# Patient Record
Sex: MALE | Race: BLACK OR AFRICAN AMERICAN | Employment: FULL TIME | URBAN - METROPOLITAN AREA
[De-identification: names, ages, dates, MRNs, and addresses within clinical notes are randomized per-mention and may not be internally consistent; named-entity substitution may affect disease eponyms.]

---

## 2017-10-06 ENCOUNTER — OFFICE VISIT (OUTPATIENT)
Dept: FAMILY MEDICINE | Facility: CLINIC | Age: 32
End: 2017-10-06
Payer: COMMERCIAL

## 2017-10-06 VITALS
OXYGEN SATURATION: 97 % | BODY MASS INDEX: 20.29 KG/M2 | HEART RATE: 98 BPM | SYSTOLIC BLOOD PRESSURE: 122 MMHG | TEMPERATURE: 97.2 F | HEIGHT: 69 IN | DIASTOLIC BLOOD PRESSURE: 69 MMHG | WEIGHT: 137 LBS

## 2017-10-06 DIAGNOSIS — Z00.00 ENCOUNTER FOR ROUTINE ADULT HEALTH EXAMINATION WITHOUT ABNORMAL FINDINGS: Primary | ICD-10-CM

## 2017-10-06 DIAGNOSIS — L20.84 INTRINSIC ECZEMA: ICD-10-CM

## 2017-10-06 DIAGNOSIS — Z13.6 CARDIOVASCULAR SCREENING; LDL GOAL LESS THAN 160: ICD-10-CM

## 2017-10-06 DIAGNOSIS — Z23 NEED FOR PROPHYLACTIC VACCINATION AND INOCULATION AGAINST INFLUENZA: ICD-10-CM

## 2017-10-06 LAB
CHOLEST SERPL-MCNC: 92 MG/DL
HDLC SERPL-MCNC: 62 MG/DL
LDLC SERPL CALC-MCNC: 21 MG/DL
NONHDLC SERPL-MCNC: 30 MG/DL
TRIGL SERPL-MCNC: 43 MG/DL

## 2017-10-06 PROCEDURE — 36415 COLL VENOUS BLD VENIPUNCTURE: CPT | Performed by: FAMILY MEDICINE

## 2017-10-06 PROCEDURE — 80061 LIPID PANEL: CPT | Performed by: FAMILY MEDICINE

## 2017-10-06 PROCEDURE — 99395 PREV VISIT EST AGE 18-39: CPT | Mod: 25 | Performed by: FAMILY MEDICINE

## 2017-10-06 PROCEDURE — 90686 IIV4 VACC NO PRSV 0.5 ML IM: CPT | Performed by: FAMILY MEDICINE

## 2017-10-06 PROCEDURE — 90471 IMMUNIZATION ADMIN: CPT | Performed by: FAMILY MEDICINE

## 2017-10-06 RX ORDER — TRIAMCINOLONE ACETONIDE 5 MG/G
CREAM TOPICAL
Qty: 30 G | Refills: 1 | Status: SHIPPED | OUTPATIENT
Start: 2017-10-06 | End: 2020-12-22

## 2017-10-06 ASSESSMENT — PAIN SCALES - GENERAL: PAINLEVEL: NO PAIN (0)

## 2017-10-06 NOTE — PROGRESS NOTES
SUBJECTIVE:   CC: Narinder Whitman is an 31 year old male who presents for preventative health visit.   He is accompanied by his wife and daughter.     Healthy Habits:    Do you get at least three servings of calcium containing foods daily (dairy, green leafy vegetables, etc.)? yes    Amount of exercise or daily activities, outside of work: no just working    Problems taking medications regularly not applicable    Medication side effects: No    Have you had an eye exam in the past two years? no    Do you see a dentist twice per year? yes    Do you have sleep apnea, excessive snoring or daytime drowsiness?no      Today's PHQ-2 Score:   PHQ-2 ( 1999 [employer]) 10/6/2017 10/29/2015   Q1: Little interest or pleasure in doing things 0 0   Q2: Feeling down, depressed or hopeless 0 0   PHQ-2 Score 0 0       Abuse: Current or Past(Physical, Sexual or Emotional)- No  Do you feel safe in your environment - Yes  Social History   Substance Use Topics     Smoking status: Never Smoker     Smokeless tobacco: Never Used     Alcohol use No     The patient does not drink >3 drinks per day nor >7 drinks per week.    Past medical, family, and social histories, medications, and allergies are reviewed and updated in Epic.     ROS:  C: NEGATIVE for fever, chills, change in weight  I: He mentions an itchy area on his leg that has been there for years.   E: NEGATIVE for vision changes or irritation  ENT: NEGATIVE for ear, mouth and throat problems  R: NEGATIVE for significant cough or SOB  CV: NEGATIVE for chest pain, palpitations or peripheral edema  GI: NEGATIVE for nausea, abdominal pain, heartburn, or change in bowel habits   male: negative for dysuria, hematuria, decreased urinary stream, erectile dysfunction, urethral discharge  M: NEGATIVE for significant arthralgias or myalgia  N: NEGATIVE for weakness, dizziness or paresthesias  P: NEGATIVE for changes in mood or affect    Any history above obtained by the Medical Assistant was  "reviewed by Dr. Talon Calvin MD, and edited when necessary.    This document serves as a record of the services and decisions personally performed and made by Dr. Calvin. It was created on his behalf by Luna Joseph, a trained medical scribe. The creation of this document is based the provider's statements to the medical scribe.  Luna Joseph October 6, 2017 2:04 PM      OBJECTIVE:   /69  Pulse 98  Temp 97.2  F (36.2  C) (Oral)  Ht 5' 8.5\" (1.74 m)  Wt 137 lb (62.1 kg)  SpO2 97%  BMI 20.53 kg/m2     EXAM:  GENERAL: healthy, alert and no distress  EYES: Eyes grossly normal to inspection, PERRL and conjunctivae and sclerae normal  HENT: ear canals and TM's normal, nose and mouth without ulcers or lesions  NECK: no adenopathy, no asymmetry, masses, or scars and thyroid normal to palpation  RESP: lungs clear to auscultation, no crackles or wheezes, no areas of dullness, no tachypnea   CV: regular rate and rhythm, normal S1 S2, no S3 or S4, no murmur, click or rub, no peripheral edema and peripheral pulses strong  ABDOMEN: soft, nontender, no hepatosplenomegaly, no masses and bowel sounds normal   (male): normal male genitalia without lesions or urethral discharge, no hernia  MS: no gross musculoskeletal defects noted, no edema  SKIN: He has a large patch on the right lateral leg extending to the knee with diffuse mild lichenification and post-inflammatory hyperpigmentation.  NEURO: Normal strength and tone, mentation intact and speech normal, DTRs symmetrical, cranial nerves 2-12 intact   PSYCH: mentation appears normal, affect normal/bright     ASSESSMENT/PLAN:   (Z00.00) Encounter for routine adult health examination without abnormal findings  (primary encounter diagnosis)  Comment: Negative screening exam; up-to-date on preventive services.   Plan: Lipid panel reflex to direct LDL        Follow up in 2-3 years.    (L20.84) Intrinsic eczema  Comment: Chronic in a fixed location. I don't " "suspect any other underlying disease.   Plan: triamcinolone (KENALOG) 0.5 % cream        Follow up as needed.     (Z13.6) CARDIOVASCULAR SCREENING; LDL GOAL LESS THAN 160  Comment: Non-fasting.   Plan: Lipid panel reflex to direct LDL        Monitor periodically.     (Z23) Need for prophylactic vaccination and inoculation against influenza  Comment: Influenza vaccine requested and given.   Plan: FLU VAC, SPLIT VIRUS IM > 3 YO (QUADRIVALENT)         [19548], Vaccine Administration, Initial         [48865]            COUNSELING:  Reviewed preventive health counseling, as reflected in patient instructions    BP Screening:   Last 3 BP Readings:    BP Readings from Last 3 Encounters:   10/06/17 122/69   10/17/16 119/70   10/16/16 107/65     The following was recommended to the patient:  Re-screen BP within a year and recommended lifestyle modifications     reports that he has never smoked. He has never used smokeless tobacco.    Estimated body mass index is 20.53 kg/(m^2) as calculated from the following:    Height as of this encounter: 5' 8.5\" (1.74 m).    Weight as of this encounter: 137 lb (62.1 kg). For exercise, he walks, runs on the treadmill, and does sit ups.       Counseling Resources:  ATP IV Guidelines  Pooled Cohorts Equation Calculator  FRAX Risk Assessment  ICSI Preventive Guidelines  Dietary Guidelines for Americans, 2010  USDA's MyPlate  ASA Prophylaxis  Lung CA Screening    The information in this document, created by the medical scribe for me, accurately reflects the services I personally performed and the decisions made by me. I have reviewed and approved this document for accuracy prior to leaving the patient care area. October 6, 2017 2:04 PM      Talon Calvin MD  [facility]  "

## 2017-10-06 NOTE — MR AVS SNAPSHOT
After Visit Summary   10/6/2017    Narinder Whitman    MRN: [id number]           Patient Information     Date Of Birth          1985        Visit Information        Provider Department      10/6/2017 1:40 PM Talon Calvin MD [facility]        Today's Diagnoses     Encounter for routine adult health examination without abnormal findings    -  1    Intrinsic eczema        CARDIOVASCULAR SCREENING; LDL GOAL LESS THAN 160        Need for prophylactic vaccination and inoculation against influenza          Care Instructions        ================================================================================  Normal Values   Blood pressure  <140/90 for most adults    <130/80 for some chronic diseases (ask your care team about yours)    BMI (body mass index)  18.5-25 kg/m2 (based on height and weight)     Thank you for visiting [facility]    Normal or non-critical lab and imaging results will be communicated to you by MyChart, letter or phone within 7 days.  If you do not hear from us within 10 days, please call the clinic. If you have a critical or abnormal lab result, we will notify you by phone as soon as possible.     If you have any questions regarding your visit please contact:     Team Comfort:   Clinic Hours Telephone Number   Dr. Talon Nguyễn Dr. Vocal 7am-5pm  Monday - Friday [phone]  Brett Davila RN   Pharmacy 8:00am-8pm Monday-Friday    9am-5pm Saturday-Sunday [phone]   Urgent Care 11am-9pm Monday-Friday        9am-5pm Saturday-Sunday [phone]     After hours, weekend or if you need to make an appointment with your primary provider please call [phone].   After Hours nurse advise: call [facility]: [phone]    Medication Refills:  Call your pharmacy and they will forward the refill to us. Please allow 3 business days for your refills to be  completed.          Preventive Health Recommendations  Male Ages 26 - 39    Yearly exam:             See your health care provider every year in order to  o   Review health changes.   o   Discuss preventive care.    o   Review your medicines if your doctor has prescribed any.    You should be tested each year for STDs (sexually transmitted diseases), if you re at risk.     After age 35, talk to your provider about cholesterol testing. If you are at risk for heart disease, have your cholesterol tested at least every 5 years.     If you are at risk for diabetes, you should have a diabetes test (fasting glucose).  Shots: Get a flu shot each year. Get a tetanus shot every 10 years.     Nutrition:    Eat at least 5 servings of fruits and vegetables daily.     Eat whole-grain bread, whole-wheat pasta and brown rice instead of white grains and rice.     Talk to your provider about Calcium and Vitamin D.     Lifestyle    Exercise for at least 150 minutes a week (30 minutes a day, 5 days a week). This will help you control your weight and prevent disease.     Limit alcohol to one drink per day.     No smoking.     Wear sunscreen to prevent skin cancer.     See your dentist every six months for an exam and cleaning.             Follow-ups after your visit        Follow-up notes from your care team     Return in about 2 years (around 10/6/2019) for full physical.      Your next 10 appointments already scheduled     Nov 06, 2017  2:00 PM CST   New Visit with Joseluis Villegas OD   [facility]    [address] [place] [id number] [phone]              Who to contact     If you have questions or need follow up information about today's clinic visit or your schedule please contact [facility] directly at [phone].  Normal or non-critical lab and imaging results will be communicated to you by MyChart, letter or phone within 4 business  "days after the clinic has received the results. If you do not hear from us within 7 days, please contact the clinic through [employer] or phone. If you have a critical or abnormal lab result, we will notify you by phone as soon as possible.  Submit refill requests through [employer] or call your pharmacy and they will forward the refill request to us. Please allow 3 business days for your refill to be completed.          Additional Information About Your Visit        [employer] Information     [employer] lets you send messages to your doctor, view your test results, renew your prescriptions, schedule appointments and more. To sign up, go to [email or web address] . Click on \"Log in\" on the left side of the screen, which will take you to the Welcome page. Then click on \"Sign up Now\" on the right side of the page.     You will be asked to enter the access code listed below, as well as some personal information. Please follow the directions to create your username and password.     Your access code is: [email or web address]  Expires: 2018  2:20 PM     Your access code will  in 90 days. If you need help or a new code, please call your [facility] clinic or [phone].        Care EveryWhere ID     This is your Care EveryWhere ID. This could be used by other organizations to access your [facility] medical records  [id number]        Your Vitals Were     Pulse Temperature Height Pulse Oximetry BMI (Body Mass Index)       98 97.2  F (36.2  C) (Oral) 5' 8.5\" (1.74 m) 97% 20.53 kg/m2        Blood Pressure from Last 3 Encounters:   10/06/17 122/69   10/17/16 119/70   10/16/16 107/65    Weight from Last 3 Encounters:   10/06/17 137 lb (62.1 kg)   10/17/16 129 lb (58.5 kg)   10/16/16 124 lb 3.2 oz (56.3 kg)              We Performed the Following     FLU VAC, SPLIT VIRUS IM > 3 YO (QUADRIVALENT) [54380]     Lipid panel reflex to direct LDL     Vaccine Administration, Initial [53561]          Today's Medication Changes          These " changes are accurate as of: 10/6/17  2:20 PM.  If you have any questions, ask your nurse or doctor.               Start taking these medicines.        Dose/Directions    triamcinolone 0.5 % cream   Commonly known as:  KENALOG   Used for:  Intrinsic eczema   Started by:  Talon Calvin MD        Apply sparingly to affected area up to 3 times per day as needed.   Quantity:  30 g   Refills:  1         Stop taking these medicines if you haven't already. Please contact your care team if you have questions.     cyclobenzaprine 10 MG tablet   Commonly known as:  FLEXERIL   Stopped by:  Talon Calvin MD           indomethacin 25 MG capsule   Commonly known as:  INDOCIN   Stopped by:  Talon Calvin MD                Where to get your medicines      These [place] [facility] [place] [address]  [address], [facility] [address]     Phone:  [phone]     triamcinolone 0.5 % cream                Primary Care Provider Office Phone # Fax #    Talon Calvin [phone]       [address]  [facility] [address]        Equal Access to Services     [facility] AH: Hadii aad ku hadasho Soomaali, waaxda luqadaha, qaybta kaalmada adeegyada, waxay idiin hayaan thomas meadearasuzan lajose j ah. So [facility] [phone].    ATENCIÓN: Si habla español, tiene a coyle disposición servicios gratuitos de asistencia lingüística. [facility] [phone].    We comply with applicable federal civil rights laws and [place] laws. We do not discriminate on the basis of race, color, national origin, age, disability, sex, sexual orientation, or gender identity.            Thank you!     Thank you for choosing [facility]  for your care. Our goal is always to provide you with excellent care. Hearing back from our patients is one way we can continue to improve our services. Please take a few minutes to complete the written survey that you may  receive in the mail after your visit with us. Thank you!             Your Updated Medication List - Protect others around you: Learn how to safely use, store and throw away your medicines at www.disposemymeds.org.          This list is accurate as of: 10/6/17  2:20 PM.  Always use your most recent med list.                   Brand Name Dispense Instructions for use Diagnosis    triamcinolone 0.5 % cream    KENALOG    30 g    Apply sparingly to affected area up to 3 times per day as needed.    Intrinsic eczema

## 2017-10-06 NOTE — PATIENT INSTRUCTIONS
================================================================================  Normal Values   Blood pressure  <140/90 for most adults    <130/80 for some chronic diseases (ask your care team about yours)    BMI (body mass index)  18.5-25 kg/m2 (based on height and weight)     Thank you for visiting [facility]    Normal or non-critical lab and imaging results will be communicated to you by MyChart, letter or phone within 7 days.  If you do not hear from us within 10 days, please call the clinic. If you have a critical or abnormal lab result, we will notify you by phone as soon as possible.     If you have any questions regarding your visit please contact:     Team Comfort:   Clinic Hours Telephone Number   Dr. Talon Nguyễn Dr. Vocal 7am-5pm  Monday - Friday [phone]  Brett RN  Donna Davila RN   Pharmacy 8:00am-8pm Monday-Friday    9am-5pm Saturday-Sunday [phone]   Urgent Care 11am-9pm Monday-Friday        9am-5pm Saturday-Sunday [phone]     After hours, weekend or if you need to make an appointment with your primary provider please call [phone].   After Hours nurse advise: call [facility]: [phone]    Medication Refills:  Call your pharmacy and they will forward the refill to us. Please allow 3 business days for your refills to be completed.          Preventive Health Recommendations  Male Ages 26 - 39    Yearly exam:             See your health care provider every year in order to  o   Review health changes.   o   Discuss preventive care.    o   Review your medicines if your doctor has prescribed any.    You should be tested each year for STDs (sexually transmitted diseases), if you re at risk.     After age 35, talk to your provider about cholesterol testing. If you are at risk for heart disease, have your cholesterol tested at least every 5 years.     If you are at risk for diabetes, you should have a  diabetes test (fasting glucose).  Shots: Get a flu shot each year. Get a tetanus shot every 10 years.     Nutrition:    Eat at least 5 servings of fruits and vegetables daily.     Eat whole-grain bread, whole-wheat pasta and brown rice instead of white grains and rice.     Talk to your provider about Calcium and Vitamin D.     Lifestyle    Exercise for at least 150 minutes a week (30 minutes a day, 5 days a week). This will help you control your weight and prevent disease.     Limit alcohol to one drink per day.     No smoking.     Wear sunscreen to prevent skin cancer.     See your dentist every six months for an exam and cleaning.

## 2017-10-06 NOTE — PROGRESS NOTES
Injectable Influenza Immunization Documentation    1.  Is the person to be vaccinated sick today?   No    2. Does the person to be vaccinated have an allergy to a component   of the vaccine?   No    3. Has the person to be vaccinated ever had a serious reaction   to influenza vaccine in the past?   No    4. Has the person to be vaccinated ever had Guillain-Barré syndrome?   No    Form completed by Abebe JOHNSON

## 2017-10-06 NOTE — NURSING NOTE
"Chief Complaint   Patient presents with     Physical     Flu Shot       Initial /69  Pulse 98  Temp 97.2  F (36.2  C) (Oral)  Ht 5' 8.5\" (1.74 m)  Wt 137 lb (62.1 kg)  SpO2 97%  BMI 20.53 kg/m2 Estimated body mass index is 20.53 kg/(m^2) as calculated from the following:    Height as of this encounter: 5' 8.5\" (1.74 m).    Weight as of this encounter: 137 lb (62.1 kg).  Medication Reconciliation: complete   Abebe JOHNSON        "

## 2017-10-06 NOTE — LETTER
2017      Narinder Whitman  [address]  [place] [address]              Dear Narinder Whitman      Your LDL (bad cholesterol) was at goal. Your HDL (good cholesterol) was normal. Your triglycerides were normal. You need to recheck fasting labs every five years.     Enclosed is a copy of the results.  It was a pleasure to see you at your last appointment.    If you have any questions or concerns, please call myself or my nurse at [phone].      Sincerely,      Talon Calvin MD/WALESKA Lora MA  TEAM COMFORT      Results for orders placed or performed in visit on 10/06/17   Lipid panel reflex to direct LDL   Result Value Ref Range    Cholesterol 92 <200 mg/dL    Triglycerides 43 <150 mg/dL    HDL Cholesterol 62 >39 mg/dL    LDL Cholesterol Calculated 21 <100 mg/dL    Non HDL Cholesterol 30 <130 mg/dL                 RE: Narinder Whitman   MRN: [id number]  : 1985  ENC DATE: Oct 6, 2017

## 2017-11-06 ENCOUNTER — OFFICE VISIT (OUTPATIENT)
Dept: OPTOMETRY | Facility: CLINIC | Age: 32
End: 2017-11-06
Payer: COMMERCIAL

## 2017-11-06 DIAGNOSIS — H52.13 MYOPIA OF BOTH EYES: ICD-10-CM

## 2017-11-06 DIAGNOSIS — H52.223 REGULAR ASTIGMATISM OF BOTH EYES: ICD-10-CM

## 2017-11-06 PROCEDURE — 92004 COMPRE OPH EXAM NEW PT 1/>: CPT | Performed by: OPTOMETRIST

## 2017-11-06 PROCEDURE — 92015 DETERMINE REFRACTIVE STATE: CPT | Performed by: OPTOMETRIST

## 2017-11-06 ASSESSMENT — EXTERNAL EXAM - RIGHT EYE: OD_EXAM: NORMAL

## 2017-11-06 ASSESSMENT — TONOMETRY
OD_IOP_MMHG: 17
IOP_METHOD: TONOPEN
OS_IOP_MMHG: 14

## 2017-11-06 ASSESSMENT — VISUAL ACUITY
OD_SC+: -1
OS_SC: 20/25
OS_SC+: -1
OD_CC: 20/30
OS_CC+: -2
OD_SC: 20/30
OD_CC+: -2
METHOD: SNELLEN - LINEAR
OS_CC: 20/25
OD_CC: 20/25
CORRECTION_TYPE: GLASSES
OS_CC: 20/30

## 2017-11-06 ASSESSMENT — REFRACTION_MANIFEST
OS_SPHERE: -0.75
OS_AXIS: 078
OD_CYLINDER: +0.50
OD_AXIS: 090
OS_CYLINDER: +0.50
OD_SPHERE: -1.50

## 2017-11-06 ASSESSMENT — CONF VISUAL FIELD
OD_NORMAL: 1
OS_NORMAL: 1

## 2017-11-06 ASSESSMENT — REFRACTION_WEARINGRX
OS_CYLINDER: +0.50
OD_SPHERE: -0.75
OD_CYLINDER: SPHERE
OS_SPHERE: -0.25
SPECS_TYPE: SVL
OS_AXIS: 050

## 2017-11-06 ASSESSMENT — EXTERNAL EXAM - LEFT EYE: OS_EXAM: NORMAL

## 2017-11-06 ASSESSMENT — CUP TO DISC RATIO
OS_RATIO: 0.3
OD_RATIO: 0.2

## 2017-11-06 ASSESSMENT — SLIT LAMP EXAM - LIDS
COMMENTS: NORMAL
COMMENTS: NORMAL

## 2017-11-06 NOTE — PROGRESS NOTES
Chief Complaint   Patient presents with     COMPREHENSIVE EYE EXAM      Accompanied by daughter and goddaughter  Last Eye Exam: 5 years  Dilated Previously: No, side effects of dilation explained today,info given to patient     What are you currently using to see?  glasses       Distance Vision Acuity: Satisfied with vision    Near Vision Acuity: Satisfied with vision while reading  with glasses    Eye Comfort: good  Do you use eye drops? : No  Occupation or Hobbies: graphic design     Carmen Mar Optometric Assistant, A.B.O.C.          Medical, surgical and family histories reviewed and updated 11/6/2017.       OBJECTIVE: See Ophthalmology exam    ASSESSMENT:    ICD-10-CM    1. Myopia of both eyes H52.13 EYE EXAM (SIMPLE-NONBILLABLE)     REFRACTION   2. Regular astigmatism of both eyes H52.223 EYE EXAM (SIMPLE-NONBILLABLE)     REFRACTION      PLAN:     Patient Instructions   Recommend new glasses.    Return in 1 year for a complete eye exam or sooner if needed.    Joseluis Villegas, OD

## 2017-11-06 NOTE — PATIENT INSTRUCTIONS
Recommend new glasses.    Return in 1 year for a complete eye exam or sooner if needed.    Joseluis Villegas OD      The affects of the dilating drops last for 4- 6 hours.  You will be more sensitive to light and vision will be blurry up close.  Mydriatic sunglasses were given if needed.      Optometry Providers       Clinic Locations                                 Telephone Number   Dr. Ama Villegas   [facility]  [phone]     [facility] Hours:                [facility] Hours:       [facility] Hours:  [address]   [address] [facility] [address]     [address] [place] [address]   [place] [address]    [place] [address]  Phone: [phone]                    Phone [phone]                      Phone: [phone]                          Monday 8:00-7:00                          Monday 8:00-7:00                          Monday 8:00-7:00              Tuesday 8:00-6:00                          Tuesday 8:00-7:00                          Tuesday 8:00-7:00              Wednesday 8:00-6:00                  Wednesday 8:00-7:00                   Wednesday 8:00-7:00      Thursday 8:00-6:00                        Thursday 8:00-7:00                         Thursday 8:00-7:00            Friday 8:00-5:00                              Friday 8:00-5:00                              Friday 8:00-5:00    Please log on to [email or web address] to order your contact lenses.  The link is found on the [facility] page.  As always, Thank you for trusting us with your health care needs!

## 2017-11-06 NOTE — MR AVS SNAPSHOT
After Visit Summary   11/6/2017    Narinder Whitman    MRN: [id number]           Patient Information     Date Of Birth          1985        Visit Information        Provider Department      11/6/2017 2:00 PM Joseluis Villegas OD [facility]        Today's Diagnoses     Myopia of both eyes        Regular astigmatism of both eyes          Care Instructions    Recommend new glasses.    Return in 1 year for a complete eye exam or sooner if needed.    Joseluis Villegas, MARTINA      The affects of the dilating drops last for 4- 6 hours.  You will be more sensitive to light and vision will be blurry up close.  Mydriatic sunglasses were given if needed.      Optometry Providers       Clinic Locations                                 Telephone Number   Dr. Ama Villegas   [facility]  [phone]     [facility] Hours:                [facility] Hours:       [facility] Hours:  [address]   [address] [facility]     [address] [place] [address]   [place] [address]    [place] [address]  Phone: [phone]                    Phone [phone]                      Phone: [phone]                          Monday 8:00-7:00                          Monday 8:00-7:00                          Monday 8:00-7:00              Tuesday 8:00-6:00                          Tuesday 8:00-7:00                          Tuesday 8:00-7:00              Wednesday 8:00-6:00                  Wednesday 8:00-7:00                   Wednesday 8:00-7:00      Thursday 8:00-6:00                        Thursday 8:00-7:00                         Thursday 8:00-7:00            Friday 8:00-5:00                              Friday 8:00-5:00                              Friday 8:00-5:00    Please log on to [email or web address] to order your contact lenses.  The link is found on the Eye Care and  "Vision Services page.  As always, Thank you for trusting us with your health care needs!            Follow-ups after your visit        Who to contact     If you have questions or need follow up information about today's clinic visit or your schedule please contact [facility] NARESH Hershey directly at [phone].  Normal or non-critical lab and imaging results will be communicated to you by MyChart, letter or phone within 4 business days after the clinic has received the results. If you do not hear from us within 7 days, please contact the clinic through MyChart or phone. If you have a critical or abnormal lab result, we will notify you by phone as soon as possible.  Submit refill requests through [employer] or call your pharmacy and they will forward the refill request to us. Please allow 3 business days for your refill to be completed.          Additional Information About Your Visit        MyChart Information     [employer] lets you send messages to your doctor, view your test results, renew your prescriptions, schedule appointments and more. To sign up, go to [email or web address] . Click on \"Log in\" on the left side of the screen, which will take you to the Welcome page. Then click on \"Sign up Now\" on the right side of the page.     You will be asked to enter the access code listed below, as well as some personal information. Please follow the directions to create your username and password.     Your access code is: [email or web address]  Expires: 2018  1:20 PM     Your access code will  in 90 days. If you need help or a new code, please call your [facility] clinic or [phone].        Care EveryWhere ID     This is your Care EveryWhere ID. This could be used by other organizations to access your [facility] medical records  [id number]         Blood Pressure from Last 3 Encounters:   10/06/17 122/69   10/17/16 119/70   10/16/16 107/65    Weight from Last 3 Encounters:   10/06/17 62.1 kg (137 lb)   10/17/16 " 58.5 kg (129 lb)   10/16/16 56.3 kg (124 lb 3.2 oz)              We Performed the Following     EYE EXAM (SIMPLE-NONBILLABLE)     REFRACTION        Primary Care Provider Office Phone # Fax #    Talon Calvin [phone] [address]  [facility] [address]        Equal Access to Services     [facility]: Hadii aad ku hadasho Soomaali, waaxda luqadaha, qaybta kaalmada adeegyada, waxay idiin hayaan adeeg kharash la'aan ah. So [facility] [phone].    ATENCIÓN: Si habla español, tiene a coyle disposición servicios gratuitos de asistencia lingüística. Llame al [phone].    We comply with applicable federal civil rights laws and [place] laws. We do not discriminate on the basis of race, color, national origin, age, disability, sex, sexual orientation, or gender identity.            Thank you!     Thank you for choosing [facility]  for your care. Our goal is always to provide you with excellent care. Hearing back from our patients is one way we can continue to improve our services. Please take a few minutes to complete the written survey that you may receive in the mail after your visit with us. Thank you!             Your Updated Medication List - Protect others around you: Learn how to safely use, store and throw away your medicines at www.disposemymeds.org.          This list is accurate as of: 11/6/17  3:07 PM.  Always use your most recent med list.                   Brand Name Dispense Instructions for use Diagnosis    triamcinolone 0.5 % cream    KENALOG    30 g    Apply sparingly to affected area up to 3 times per day as needed.    Intrinsic eczema

## 2019-10-30 ENCOUNTER — OFFICE VISIT (OUTPATIENT)
Dept: FAMILY MEDICINE | Facility: CLINIC | Age: 34
End: 2019-10-30
Payer: COMMERCIAL

## 2019-10-30 VITALS
TEMPERATURE: 97.9 F | HEART RATE: 74 BPM | SYSTOLIC BLOOD PRESSURE: 120 MMHG | OXYGEN SATURATION: 98 % | DIASTOLIC BLOOD PRESSURE: 78 MMHG | RESPIRATION RATE: 16 BRPM

## 2019-10-30 DIAGNOSIS — Z71.84 TRAVEL ADVICE ENCOUNTER: Primary | ICD-10-CM

## 2019-10-30 PROCEDURE — 90632 HEPA VACCINE ADULT IM: CPT | Mod: GA | Performed by: NURSE PRACTITIONER

## 2019-10-30 PROCEDURE — 90686 IIV4 VACC NO PRSV 0.5 ML IM: CPT | Performed by: NURSE PRACTITIONER

## 2019-10-30 PROCEDURE — 90717 YELLOW FEVER VACCINE SUBQ: CPT | Mod: GA | Performed by: NURSE PRACTITIONER

## 2019-10-30 PROCEDURE — 99402 PREV MED CNSL INDIV APPRX 30: CPT | Mod: 25 | Performed by: NURSE PRACTITIONER

## 2019-10-30 PROCEDURE — 90691 TYPHOID VACCINE IM: CPT | Mod: GA | Performed by: NURSE PRACTITIONER

## 2019-10-30 PROCEDURE — 90471 IMMUNIZATION ADMIN: CPT | Performed by: NURSE PRACTITIONER

## 2019-10-30 PROCEDURE — 90472 IMMUNIZATION ADMIN EACH ADD: CPT | Mod: GA | Performed by: NURSE PRACTITIONER

## 2019-10-30 RX ORDER — AZITHROMYCIN 500 MG/1
500 TABLET, FILM COATED ORAL DAILY
Qty: 3 TABLET | Refills: 0 | Status: SHIPPED | OUTPATIENT
Start: 2019-10-30 | End: 2019-11-02

## 2019-10-30 RX ORDER — ATOVAQUONE AND PROGUANIL HYDROCHLORIDE 250; 100 MG/1; MG/1
1 TABLET, FILM COATED ORAL DAILY
Qty: 42 TABLET | Refills: 0 | Status: SHIPPED | OUTPATIENT
Start: 2019-10-30 | End: 2020-12-22

## 2019-10-30 NOTE — PATIENT INSTRUCTIONS
Today October 30, 2019 you received the    Flu Vaccine    Hepatitis A Vaccine - Please return on 4/27/20 or later for your 2nd and final dose.    Yellow Fever (YF)    Typhoid - injectable. This vaccine is valid for two years.   .    These appointments can be made as a NURSE ONLY visit.    **It is very important for the vaccinations to be given on the scheduled day(s), this helps ensure you receive the full effectiveness of the vaccine.**    Please call [facility] with any questions [phone]    Thank you for visiting [facility]

## 2019-10-30 NOTE — PROGRESS NOTES
Nurse Note      Itinerary:  [facility]       Departure Date: 11/14/2019      Return Date: 12/15/2019      Length of Trip 1 month       Reason for Travel: Visiting friends and relatives           Urban or rural: both      Accommodations: Family home        IMMUNIZATION HISTORY  Have you received any immunizations within the past 4 weeks?  No  Have you ever fainted from having your blood drawn or from an injection?  No  Have you ever had a fever reaction to vaccination?  No  Have you ever had any bad reaction or side effect from any vaccination?  No  Have you ever had hepatitis A or B vaccine?  Yes  Do you live (or work closely) with anyone who has AIDS, an AIDS-like condition, any other immune disorder or who is on chemotherapy for cancer?  No  Do you have a family history of immunodeficiency?  No  Have you received any injection of immune globulin or any blood products during the past 12 months?  No    Patient roomed by ELIZABETH Lopez  Narinder Whitman is a 33 year old male seen today alone for counsultation for international travel to the stated countries.   Patient will be departing in  2 week(s) and  traveling alone.      Patient itinerary :  will be in the urban region [facility] which presents risk for Malaria and Yellow Fever. exposure.      Patient's activities will include visiting friends and relatives.    Patient's country of birth is [place] and moved to  [place]    Special medical concerns: none  Pre-travel questionnaire was completed by patient and reviewed by provider.     Vitals: /78   Pulse 74   Temp 97.9  F (36.6  C) (Oral)   Resp 16   SpO2 98%   BMI= There is no height or weight on file to calculate BMI.    EXAM:  General:  Well-nourished, well-developed in no acute distress.  Appears to be stated age, interacts appropriately and expresses understanding of information given to patient.    Current Outpatient Medications   Medication Sig Dispense Refill      atovaquone-proguanil (MALARONE) 250-100 MG tablet Take 1 tablet by mouth daily Start 2 days before exposure to Malaria and continue daily till  7 days after exposure. 42 tablet 0     azithromycin (ZITHROMAX) 500 MG tablet Take 1 tablet (500 mg) by mouth daily for 3 doses Take 1 tablet a day for up to 3 days for severe diarrhea 3 tablet 0     triamcinolone (KENALOG) 0.5 % cream Apply sparingly to affected area up to 3 times per day as needed. (Patient not taking: Reported on 10/30/2019) 30 g 1     There is no problem list on file for this patient.    No Known Allergies      Immunizations discussed include:   Hepatitis A:  Ordered/given today, risks, benefits and side effects reviewed  Hepatitis B: Up to date  Influenza: Ordered/given today, risks, benefits and side effects reviewed  Typhoid: Ordered/given today, risks, benefits and side effects reviewed  Rabies: Declined  reviewed managment of a animal bite or scratch (washing wound, seek medical care within 24 hours for post exposure prophylaxis )  Yellow Fever: Stamaril Ordered/given today - consent completed, side effects, precautions, allergies, risks discussed. Patient expressed understanding.  Spanish Encephalitis: Not indicated  Meningococcus: Not indicated  Tetanus/Diphtheria: Up to date  Measles/Mumps/Rubella: Up to date  Cholera: Not needed  Polio: Up to date  Pneumococcal: Under age of 65  Varicella: Up to date  Zostavax:  Not indicated  Shingrix: Not indicated  HPV:  Not indicated  TB:  Tested neg upon arrival .  Short exposure    Stamaril Informed Consent    The patient was provided with a copy of the IRB-approved consent form and all questions were answered before the patient agreed to participate by signing the informed consent document.   A copy of the form was provided to the patient.    Date: October 30, 2019   Consent Version Date: 5/10/2017   Consent Obtained by:  Dominique Palumbo CNP (Lori)     HIPAA:  Yes  HIPAA Authorization Signed Date: October  30, 2019       Inclusion/Exclusion Criteria:    (Similar to Yellow Fever-VAX)      The patient met all of the following inclusion criteria in order to be eligible for the Stamaril vaccination under this EAP (Expanded Access Investigational New Drug Program)           At increased risk for YF, including researchers, laboratory workers, vaccine production staff, and those who are traveling within 30 days to a YF-endemic region or to a country requiring proof of YF vaccination under IHRs (International Health Regulations)?       Yes     Patient is greater than or equal to 9 months of age on the day of vaccination?     Yes     Patient is greater than or equal to 18 years of age and signed and dated the Consent Forms?     Yes     Patient is < 18 years of age and parent(s)/guardian(s) signed and dated the Consent Forms?      Patient is 7 years to < 18 years of age and signed and dated the Assent form?        No Assent is required.  Patient is <7 years of age.     No      No      N/A     The patient did not meet any of the following criteria that would have excluded the patient from receiving the Stamaril vaccination under this EAP              Patient is less than 9 months of age.       No     The patient is breast-feeding and cannot stop nursing for at least 14 days after vaccination.    Note: Yellow Fever vaccine virus may be transmissible via breast milk by nursing mothers who are vaccinated during the final 2 weeks of pregnancy or post-partum.   Following transmission, infants may develop encephalitis.  The minimum time of discontinuation of breastfeeding for 14 days after vaccination is based on the expected clearance of live-attenuated vaccine virus.       No     The patient is immunosuppressed, whether congenital or idiopathic, including for example, leukemia, lymphoma, other malignancies, and patients who are receiving immunosuppressant medications (e.g. Systemic corticosteroids [greater than the standard dose of  topical or inhaled steroids], alkylating drugs, antimetabolites, of other cytotoxic or immunomodulatory drugs) or radiation therapy or organ transplantation.       No     The patient has known hypersensitivity to the active substance or to any of the excipients of Stamaril vaccine or to eggs or chicken proteins.     No     The patient is symptomatic for human immunodeficiency virus (HIV) infection     No     The patient is asymptomatic for HIV infection but accompanied by evidence of severe immune suppression    Note:  Evidence of severe immune suppression includes CD4+ T-cell counts < 200 cubic millimeters (or < 15% total lymphocytes in children aged < 6 years), or as determined by the health care provider.       No     The patient has a history of thymus dysfunction (including myasthenia gravis, thymoma, thymectomy)     No     Moderate or severe febrile illness or acute illness    Note: Participation in the EAP can be reassessed when moderate or severe febrile illness or acute illness has resolved.       No             Altitude Exposure on this trip: no  Past tolerance to Altitude: na    ASSESSMENT/PLAN:    ICD-10-CM    1. Travel advice encounter Z71.84 atovaquone-proguanil (MALARONE) 250-100 MG tablet     azithromycin (ZITHROMAX) 500 MG tablet     I have reviewed general recommendations for safe travel   including: food/water precautions, insect precautions, safer sex   practices given high prevalence of Zika, HIV and other STDs,   roadway safety. Educational materials and [employer] report provided.    Malaraia prophylaxis recommended: Malarone  Symptomatic treatment for traveler's diarrhea: azithromycin  Altitude illness prevention and treatment: na      Evacuation insurance advised and resources were provided to patient.    Total visit time 30 minutes  with over 50% of time spent counseling patient as detailed above.    Dominique Palumbo CNP

## 2019-10-30 NOTE — NURSING NOTE
Prior to immunization administration, verified patients identity using patient s name and date of birth. Please see Immunization Activity for additional information.     Screening Questionnaire for Adult Immunization    Are you sick today?   No   Do you have allergies to medications, food, a vaccine component or latex?   No   Have you ever had a serious reaction after receiving a vaccination?   No   Do you have a long-term health problem with heart disease, lung disease, asthma, kidney disease, metabolic disease (e.g. diabetes), anemia, or other blood disorder?   No   Do you have cancer, leukemia, HIV/AIDS, or any other immune system problem?   No   In the past 3 months, have you taken medications that affect  your immune system, such as prednisone, other steroids, or anticancer drugs; drugs for the treatment of rheumatoid arthritis, Crohn s disease, or psoriasis; or have you had radiation treatments?   No   Have you had a seizure, or a brain or other nervous system problem?   No   During the past year, have you received a transfusion of blood or blood     products, or been given immune (gamma) globulin or antiviral drug?   No   For women: Are you pregnant or is there a chance you could become        pregnant during the next month?   No   Have you received any vaccinations in the past 4 weeks?   No     Immunization questionnaire answers were all negative.        Per orders of SAE Palumbo, injection of YF, Typhoid, Flu and Hep A given by Mary Leonard CMA. Patient instructed to remain in clinic for 15 minutes afterwards, and to report any adverse reaction to me immediately.       Screening performed by Mary Leonard CMA on 10/30/2019 at 9:51 AM.

## 2019-10-30 NOTE — NURSING NOTE
"Chief Complaint   Patient presents with     Travel Clinic     [facility]      /78   Pulse 74   Temp 97.9  F (36.6  C) (Oral)   Resp 16   SpO2 98%  Estimated body mass index is 20.53 kg/m  as calculated from the following:    Height as of 10/6/17: 1.74 m (5' 8.5\").    Weight as of 10/6/17: 62.1 kg (137 lb).  bp completed using cuff size: regular       Health Maintenance addressed:  NONE    n/a    Mary Leonard CMA, MA     "

## 2020-12-22 ENCOUNTER — OFFICE VISIT (OUTPATIENT)
Dept: FAMILY MEDICINE | Facility: CLINIC | Age: 35
End: 2020-12-22
Payer: COMMERCIAL

## 2020-12-22 ENCOUNTER — ANCILLARY PROCEDURE (OUTPATIENT)
Dept: GENERAL RADIOLOGY | Facility: CLINIC | Age: 35
End: 2020-12-22
Attending: NURSE PRACTITIONER
Payer: COMMERCIAL

## 2020-12-22 VITALS
SYSTOLIC BLOOD PRESSURE: 131 MMHG | HEART RATE: 77 BPM | DIASTOLIC BLOOD PRESSURE: 70 MMHG | HEIGHT: 69 IN | OXYGEN SATURATION: 100 % | BODY MASS INDEX: 19.55 KG/M2 | WEIGHT: 132 LBS
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
## 2020-12-22 DIAGNOSIS — R07.89 CHEST WALL PAIN: ICD-10-CM

## 2020-12-22 PROCEDURE — 99213 OFFICE O/P EST LOW 20 MIN: CPT | Performed by: NURSE PRACTITIONER

## 2020-12-22 PROCEDURE — 71101 X-RAY EXAM UNILAT RIBS/CHEST: CPT | Mod: LT | Performed by: RADIOLOGY

## 2020-12-22 ASSESSMENT — PAIN SCALES - GENERAL: PAINLEVEL: NO PAIN (0)

## 2020-12-22 ASSESSMENT — MIFFLIN-ST. JEOR: SCORE: 1516.19
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
## 2020-12-22 NOTE — PATIENT INSTRUCTIONS
Ice 15 minutes 3-4 times daily  Tylenol or ibuprofen for pain  Take deep breaths so you don't get pneumonia  X-ray today  If worsening or not improving, please follow up in clinic    Patient Education    At [facility], we strive to deliver an exceptional experience to you, every time we see you. If you receive a survey, please complete it as we do value your feedback.  If you have MyChart, you can expect to receive results automatically within 24 hours of their completion.  Your provider will send a note interpreting your results as well.   If you do not have MyChart, you should receive your results in about a week by mail.    Your care team:                            Family Medicine Internal Medicine   MD Irvin Weston, MD Baldemar Chaudhari MD Katya Georgiev PA-C Megan Hill, APRN CNP    Ezequiel Nguyễn, MD Pediatrics   Preet Galdamez, PA-C  Margaret Delacruz, CNP MD Rebekah Victor APRN CNP   MD Susan Mcallister MD Deborah Mielke, MD Milagro Pablo, APRN CNP  Nette Mallory, PAYifanC  Kimberly Hawkins, CNP  MD Nuria Casey MD Angela Wermerskirchen, MD      Clinic hours: Monday - Thursday 7 am-7 pm; Fridays 7 am-5 pm.   Urgent care: Monday - Friday 11 am-9 pm; Saturday and Sunday 9 am-5 pm.    Clinic: [phone]       [facility]: Monday - Thursday 8 am - 7 pm; Friday 8 am - 6 pm  [facility]: [phone]     Use [email or web address] for 24/7 diagnosis and treatment of dozens of conditions.    Patient Education     Chest Bruise (Contusion)    The chest wall runs from the shoulders to the diaphragm or bottom of the ribs. It includes the front and back of the rib cage. It also includes the breastbone, shoulders, and collarbones. A blunt trauma such as during a car accident or fall can injure the chest wall. This injury is called a chest wall  bruise (contusion).   Injury to the chest wall may result in pain, tenderness, bruising, and swelling. It may also result in broken ribs and injured muscles. These cause pain, often during breathing. If one or more ribs are broken in several areas, the chest wall may become unstable and painful. This may cause serious breathing trouble.   In the emergency room or urgent care center, any broken bones or other injuries will be assessed. You will likely be given medicine for pain. Broken ribs usually heal without further treatment. A broken shoulder or collarbone may be taped or supported with a sling.   Home care  Follow these guidelines when caring for yourself at home:    Rest. Don t do any heavy lifting or strenuous activity. Don t do any activity that causes pain.    Put an ice pack on the injured area. Do this for 20 minutes every 1 to 2 hours the first day. You can make an ice pack by wrapping a plastic bag of ice cubes in a thin towel. Continue to use the ice pack 3 to 4 times a day for the next 2 days. Then use the ice pack as needed to ease pain and swelling.    After 1 to 2 days you may put a warm compress on the area. Do this for 10 minutes several times a day. A warm compress is a clean cloth that s damp with warm water.    Hold a pillow to the affected area when you cough. This will help ease pain.    You may use over-the-counter pain medicine such as acetaminophen or ibuprofen to control pain, unless another pain medicine was prescribed. If you have chronic liver or kidney disease, talk with your healthcare provider before using these medicines. Also talk with your provider if you ve had a stomach ulcer or gastrointestinal bleeding.  Follow-up care  Follow up with your healthcare provider, or as advised.  When to seek medical advice  Call your healthcare provider right away if any of these occur:    New abdominal pain or abdominal pain that gets worse    Fever of 100.4 F (38 C) or higher, or as directed  by your healthcare provider  Call 911  Call 911 if any of these occur:      Dizziness, weakness, or fainting    Shortness of breath, trouble breathing, or breathing fast    Chest pain gets worse when you breathe    Severe pain that comes on suddenly or lasts more than an hour  StayWell last reviewed this educational content on 11/1/2019 2000-2020 [employer]. [address] [place] [address]. All rights reserved. This information is not intended as a substitute for professional medical care. Always follow your healthcare professional's instructions.

## 2021-03-28 ENCOUNTER — NURSE TRIAGE (OUTPATIENT)
Dept: NURSING | Facility: CLINIC | Age: 36
End: 2021-03-28

## 2021-03-28 NOTE — TELEPHONE ENCOUNTER
"Patient calling stating left arm, shoulder pain  starting 3/25/21.    Pain is \"sharp\" intermittent in left shoulder and arm. Reporting pain increases with movement or laying on arm. Stating it is hard to take a deep breath when pain occurs. Difficulty sleeping due to pain.  Denies injury.   Patient took Tylenol last at 830 pm. Stating he has not taken anything for pain today.     Disposition to see provider with in 24 hours.  Patient agrees to go to [facility] this morning.    Mely Kern RN  [facility]      COVID 19 Nurse Triage Plan/Patient Instructions    Please be aware that novel coronavirus (COVID-19) may be circulating in the community. If you develop symptoms such as fever, cough, or SOB or if you have concerns about the presence of another infection including coronavirus (COVID-19), please contact your health care provider or visit [email or web address].     Disposition/Instructions    In-Person Visit with provider recommended. Reference Visit Selection Guide.    Thank you for taking steps to prevent the spread of this virus.  o Limit your contact with others.  o Wear a simple mask to cover your cough.  o Wash your hands well and often.    Resources    [facility]: About COVID-19: [email or web address]    CDC: What to Do If You're Sick: www.cdc.gov/coronavirus/2019-ncov/about/steps-when-sick.html    CDC: Ending Home Isolation: www.cdc.gov/coronavirus/2019-ncov/hcp/disposition-in-home-patients.html     CDC: Caring for Someone: www.cdc.gov/coronavirus/2019-ncov/if-you-are-sick/care-for-someone.html     [facility]: Interim Guidance for Hospital Discharge to Home: www.health.[facility].mn.us/diseases/coronavirus/hcp/hospdischarge.pdf    [facility] clinical trials (COVID-19 research studies): clinicalaffairs.[facility]/umn-clinical-trials     Below are the COVID-19 hotlines at the [place] Department of Health ([facility]). Interpreters are available.   o For health " "questions: Call 929-257-7370 or 1-721.572.3668 (7 a.m. to 7 p.m.)  o For questions about schools and childcare: Call 252-227-0240 or 1-365.383.6717 (7 a.m. to 7 p.m.)                   Additional Information    Negative: Passed out (i.e., lost consciousness, collapsed and was not responding)    Negative: Shock suspected (e.g., cold/pale/clammy skin, too weak to stand, low BP, rapid pulse)    Negative: [1] Similar pain previously AND [2] it was from \"heart attack\"    Negative: [1] Similar pain previously AND [2] it was from \"angina\" AND [3] not relieved by nitroglycerin    Negative: Sounds like a life-threatening emergency to the triager    Negative: Followed a shoulder injury    Negative: Chest pain    Negative: Difficulty breathing or unusual sweating (e.g., sweating without exertion)    Negative: [1] Pain lasting > 5 minutes AND [2] pain also present in chest  (Exception: pain is clearly made worse by movement)    Negative: [1] Age > 40 AND [2] no obvious cause AND [3] pain even when not moving the arm    (Exception: pain is clearly made worse by moving arm or bending neck)    Negative: [1] SEVERE pain AND [2] not improved 2 hours after pain medicine    Negative: [1] Red area or streak AND [2] fever    Negative: [1] Swollen joint AND [2] fever    Negative: Patient sounds very sick or weak to the triager    Negative: Entire arm is swollen    Negative: Weakness (i.e., loss of strength) in hand or fingers    (Exception: not truly weak; hand feels weak because of pain)    Negative: [1] Shoulder pains with exertion (e.g., walking) AND [2] pain goes away on resting AND [3] not present now    Negative: [1] Painful rash AND [2] multiple small blisters grouped together (i.e., dermatomal distribution or \"band\" or \"stripe\")    Negative: Looks like a boil, infected sore, deep ulcer or other infected rash (spreading redness, pus)    Negative: [1] Localized rash is very painful AND [2] no fever    Negative: Numbness (i.e., loss " of sensation) in hand or fingers    [1] Unable to use arm at all AND [2] because of shoulder pain or stiffness    Protocols used: SHOULDER PAIN-A-AH

## 2021-03-29 ENCOUNTER — ANCILLARY PROCEDURE (OUTPATIENT)
Dept: GENERAL RADIOLOGY | Facility: CLINIC | Age: 36
End: 2021-03-29
Attending: FAMILY MEDICINE
Payer: COMMERCIAL

## 2021-03-29 ENCOUNTER — OFFICE VISIT (OUTPATIENT)
Dept: URGENT CARE | Facility: URGENT CARE | Age: 36
End: 2021-03-29
Payer: COMMERCIAL

## 2021-03-29 VITALS
HEART RATE: 83 BPM | OXYGEN SATURATION: 97 % | BODY MASS INDEX: 20.14 KG/M2 | TEMPERATURE: 97.3 F | WEIGHT: 134.4 LBS | DIASTOLIC BLOOD PRESSURE: 80 MMHG | SYSTOLIC BLOOD PRESSURE: 125 MMHG | RESPIRATION RATE: 16 BRPM

## 2021-03-29 DIAGNOSIS — R07.9 CHEST PAIN, UNSPECIFIED TYPE: ICD-10-CM

## 2021-03-29 DIAGNOSIS — R09.1 PLEURISY: Primary | ICD-10-CM

## 2021-03-29 PROCEDURE — 99214 OFFICE O/P EST MOD 30 MIN: CPT | Performed by: FAMILY MEDICINE

## 2021-03-29 PROCEDURE — 71046 X-RAY EXAM CHEST 2 VIEWS: CPT | Performed by: RADIOLOGY

## 2021-03-29 PROCEDURE — 93000 ELECTROCARDIOGRAM COMPLETE: CPT | Performed by: FAMILY MEDICINE

## 2021-03-29 ASSESSMENT — PAIN SCALES - GENERAL: PAINLEVEL: MODERATE PAIN (4)

## 2021-03-29 NOTE — PATIENT INSTRUCTIONS
Take 3 Ibuprofen every 6 hours.    Activity as tolerates.    Seek further medical attention if getting worse.

## 2021-03-29 NOTE — PROGRESS NOTES
CHIEF COMPLAINT    Chest pain.      HISTORY    Narinder, age 35, began having some pleuritic left upper chest pain about 4 days ago.  At the maximum it was level 7-8 out of 10.  Pain was intermittent.  Pain was worse with deep breath or cough or with twisting in certain positions.  He has used ibuprofen which relieves the symptoms to some extent.    He has not had fever or chills.  No cough or congestion.  No malaise.    He has no history of heart disease.      REVIEW OF SYSTEMS    No fever.  No head congestion or sore throat.  No exertional chest pain.  No nausea or diarrhea.  No rash.        EXAM  /80 (BP Location: Left arm, Patient Position: Sitting, Cuff Size: Adult Regular)   Pulse 83   Temp 97.3  F (36.3  C) (Tympanic)   Resp 16   Wt 61 kg (134 lb 6.4 oz)   SpO2 97%   BMI 20.14 kg/m      Thin, well-appearing man.  NAD.  HEENT unremarkable.  Neck no thyromegaly.  Chest clear  Cardiac RSR without murmurs or rubs.  Extremities no edema.      EKG  RSR.  Rate 62.  Conduction normal.  Axis normal.  ST segments and T waves within normal limits.  No scar.  No ectopy.  EKG is within normal limits.  No previous for comparison.      Chest x-ray  No active disease.      (R09.1) Pleurisy  (primary encounter diagnosis)  Comment:   Symptoms typical of pleurisy or chest wall pain.  Certainly a pleuritic component to his symptoms.  Work-up including EKG and chest x-ray unremarkable.  Plan:   After discussion he will continue to use ibuprofen.  Activity as tolerates advised.  To ER if symptoms worsen.      (R07.9) Chest pain, unspecified type  Comment:   Plan: EKG 12-lead complete w/read - Clinics, XR Chest        2 Views

## 2021-03-29 NOTE — LETTER
March 29, 2021        Narinder Whitman        Seen today for a medical condition.        Cséar Bobby MD on 3/29/2021 at 1:20 PM

## 2023-03-22 ENCOUNTER — OFFICE VISIT (OUTPATIENT)
Dept: FAMILY MEDICINE | Facility: CLINIC | Age: 38
End: 2023-03-22
Payer: COMMERCIAL

## 2023-03-22 VITALS
SYSTOLIC BLOOD PRESSURE: 124 MMHG | OXYGEN SATURATION: 98 % | HEART RATE: 84 BPM | DIASTOLIC BLOOD PRESSURE: 71 MMHG | TEMPERATURE: 100 F | HEIGHT: 69 IN | RESPIRATION RATE: 16 BRPM | WEIGHT: 142 LBS | BODY MASS INDEX: 21.03 KG/M2

## 2023-03-22 DIAGNOSIS — Z11.52 ENCOUNTER FOR SCREENING LABORATORY TESTING FOR COVID-19 VIRUS: ICD-10-CM

## 2023-03-22 DIAGNOSIS — Z71.84 TRAVEL ADVICE ENCOUNTER: Primary | ICD-10-CM

## 2023-03-22 PROCEDURE — 91305 COVID-19 VACCINE 12+ (PFIZER): CPT | Performed by: NURSE PRACTITIONER

## 2023-03-22 PROCEDURE — 90691 TYPHOID VACCINE IM: CPT | Mod: GA | Performed by: NURSE PRACTITIONER

## 2023-03-22 PROCEDURE — 90472 IMMUNIZATION ADMIN EACH ADD: CPT | Mod: GA | Performed by: NURSE PRACTITIONER

## 2023-03-22 PROCEDURE — 90471 IMMUNIZATION ADMIN: CPT | Mod: GA | Performed by: NURSE PRACTITIONER

## 2023-03-22 PROCEDURE — 99402 PREV MED CNSL INDIV APPRX 30: CPT | Mod: 25 | Performed by: NURSE PRACTITIONER

## 2023-03-22 PROCEDURE — 90632 HEPA VACCINE ADULT IM: CPT | Mod: GA | Performed by: NURSE PRACTITIONER

## 2023-03-22 PROCEDURE — 0051A COVID-19 VACCINE 12+ (PFIZER): CPT | Performed by: NURSE PRACTITIONER

## 2023-03-22 RX ORDER — AZITHROMYCIN 500 MG/1
500 TABLET, FILM COATED ORAL DAILY
Qty: 3 TABLET | Refills: 0 | Status: SHIPPED | OUTPATIENT
Start: 2023-03-22 | End: 2023-03-25

## 2023-03-22 RX ORDER — ATOVAQUONE AND PROGUANIL HYDROCHLORIDE 250; 100 MG/1; MG/1
1 TABLET, FILM COATED ORAL DAILY
Qty: 42 TABLET | Refills: 0 | Status: SHIPPED | OUTPATIENT
Start: 2023-03-22

## 2023-03-22 ASSESSMENT — PAIN SCALES - GENERAL: PAINLEVEL: NO PAIN (0)

## 2023-03-22 NOTE — PROGRESS NOTES
"Nurse Note ( Pre-Travel Consult)      Itinerary:  [facility]      Departure Date: 3/30/23      Return Date: 5/12/23      Length of Trip 4 weeks      Reason for Travel: Visiting friends and relatives           Urban or rural: both      Accommodations: Family home        IMMUNIZATION HISTORY  Have you received any immunizations within the past 4 weeks?  No  Have you ever fainted from having your blood drawn or from an injection?  No  Have you ever had a fever reaction to vaccination?  No  Have you ever had any bad reaction or side effect from any vaccination?  No  Have you ever had hepatitis A or B vaccine?  Yes  Do you live (or work closely) with anyone who has AIDS, an AIDS-like condition, any other immune disorder or who is on chemotherapy for cancer?  No  Do you have a family history of immunodeficiency?  No  Have you received any injection of immune globulin or any blood products during the past 12 months?  No    Patient roomed by Hi Whitman is a 37 year old male seen today alone for counsultation for international travel.   Patient will be departing in  8 day(s) and  traveling alone.      Patient itinerary :  will be in the urban  region [facility] which risk for Malaria and Yellow Fever. exposure.      Patient's activities will include visiting friends and relatives.    Patient's country of birth is [facility]    Special medical concerns: none  Pre-travel questionnaire was completed by patient and reviewed by provider.     Vitals: /71   Pulse 84   Temp 100  F (37.8  C) (Temporal)   Resp 16   Ht 1.74 m (5' 8.5\")   Wt 64.4 kg (142 lb)   SpO2 98%   BMI 21.28 kg/m    BMI= Body mass index is 21.28 kg/m .    EXAM:  General:  Well-nourished, well-developed in no acute distress.  Appears to be stated age, interacts appropriately and expresses understanding of information given to patient.    No current outpatient medications on file.     There is no problem list on " file for this patient.    No Known Allergies      Immunizations discussed include:   Covid 19: Ordered/given today, risks, benefits and side effects reviewed  Hepatitis A:  Ordered/given today, risks, benefits and side effects reviewed  Hepatitis B: Up to date  Influenza: Up to date  Typhoid: Ordered/given today, risks, benefits and side effects reviewed  Rabies: Declined  reviewed managment of a animal bite or scratch (washing wound, seek medical care within 24 hours for post exposure prophylaxis )  Yellow Fever: Up to date  Japanese Encephalitis: Not indicated  Meningococcus: Not indicated  Tetanus/Diphtheria: Up to date  Measles/Mumps/Rubella: Up to date  Cholera: Not needed  Polio: Up to date  Pneumococcal: Under age of 65  Varicella: Up to date  Shingrix: Not indicated  HPV:  Not indicated     TB: short stay    Altitude Exposure on this trip: no  Past tolerance to Altitude: na    ASSESSMENT/PLAN:  Narinder was seen today for travel clinic.    Diagnoses and all orders for this visit:    Travel advice encounter  -     COVID-19 VACCINE 12+ (PFIZER Gray Label)  -     TYPHOID VACCINE, IM  -     atovaquone-proguanil (MALARONE) 250-100 MG tablet; Take 1 tablet by mouth daily Start 2 days before exposure to Malaria and continue daily till  7 days after exposure.  -     azithromycin (ZITHROMAX) 500 MG tablet; Take 1 tablet (500 mg) by mouth daily for 3 doses Take 1 tablet a day for up to 3 days for severe diarrhea    Encounter for screening laboratory testing for COVID-19 virus  -     Asymptomatic COVID-19 Virus (Coronavirus) by PCR Nose; Future    Other orders  -     HEPATITIS A VACCINE (ADULT)  -     PFIZER COVID-19 VACCINE 2ND DOSE APPT; Future      I have reviewed general recommendations for safe travel   including: food/water precautions, insect precautions,roadway safety. Educational materials and [employer] report provided.    Ordered Pre travel covid test as Narinder is not fully vaccinated.  Patient called  airlines to confirm requirements.   [facility]    Malaraia prophylaxis recommended: Malarone  Symptomatic treatment for traveler's diarrhea: azithromycin    Evacuation insurance advised and resources were provided to patient.    Total visit time 30 minutes  with over 50% of time spent counseling patient and shared decision making as detailed above.    Dominique Palumbo CNP  Certificate in Travel Health

## 2023-03-22 NOTE — PATIENT INSTRUCTIONS
Thank you for visiting the [facility] : [phone]  Today March 22, 2023 you received the    Hepatitis A Vaccine - Please return on 9/18/23 or later for your 2nd and final dose.    Typhoid - injectable. This vaccine is valid for two years.     Covid 19 vaccine #1    Follow up vaccine appointments can be made as a NURSE ONLY visit at the Travel Clinic, (BE PREPARED TO WAIT, ) or at designated [facility].    If you are receiving the Rabies vaccines series, it is important that you follow the exact schedule ordered.     Pre-travel     We recommend that you purchase Medical Evacuation Insurance prior to your departure.  Https://wwwnc.cdc.gov/travel/page/insurance    [place] your travel plans with the [place] Department of State through STEP ( Smart Traveler Enrollment Program ) https://step.state.gov.  STEP is a free service to allow U[place] citizens and nationals traveling and living abroad to enroll their trip with the nearest U.S. Embassy or Consulate.    Animal Exposure: Avoid all mammals even if they look healthy.  If there is a bite, scratch or even a lick, wash area immediately with soap and water for 15 minutes and seek medical care within 24 hours for evaluation of Rabies post exposure treatment.  Contact your Medical Evacuation Insurance.    COVID 19 (Sars Cov2) prevention strategies  Physical distancing: Maintain 6 foot (2m) from others.              Avoid large gatherings and public transportation.   Avoid indoor shopping malls, theaters and restaurants   Practice consistent mask wearing covering the nose, mouth and underneath the chin when unable to maintain 6 foot distance from others.  Hand washing: frequent, thorough handwashing with soap and water for 20 seconds (or using a hand  containing 60% alcohol)   Avoid touching face, nose, eyes, mouth unless you have done appropriate hand washing as above.   Clean high touch surfaces with approved  disinfectant against Covid 19  (70% Ethanol ) or a bleach solution (add 20 mL (4 teaspoons) of bleach to 1 L (1 quart) of water;)  Be careful not to breath or touch bleach.      Travel Covid 19 Testing:  updated 12/06/2021  International travelers: Pre-travel: diagnostic testing (antigen or PCR) may be required for entry:  See country specific Embassy websites or airline websites.    Post travel: CDC recommends getting tested 3-5 days after your trip     COVID-19 testing scheduling number for pre-travel through [facility]  [phone] (Must have an order). Available 24 hours a day.  You can also schedule through My Chart.     Post-travel illness:  Contact your provider or [facility] if you develop a fever, rash, cough, diarrhea or other symptoms for up to 1 year after travel.  Inform your healthcare provider when and where you traveled to.    Please call the [facility] with any questions [phone]  Or send your provider a 'My Chart' note.

## 2023-03-28 ENCOUNTER — LAB (OUTPATIENT)
Dept: LAB | Facility: CLINIC | Age: 38
End: 2023-03-28
Payer: COMMERCIAL

## 2023-03-28 DIAGNOSIS — Z11.52 ENCOUNTER FOR SCREENING LABORATORY TESTING FOR COVID-19 VIRUS: ICD-10-CM

## 2023-03-28 PROCEDURE — U0003 INFECTIOUS AGENT DETECTION BY NUCLEIC ACID (DNA OR RNA); SEVERE ACUTE RESPIRATORY SYNDROME CORONAVIRUS 2 (SARS-COV-2) (CORONAVIRUS DISEASE [COVID-19]), AMPLIFIED PROBE TECHNIQUE, MAKING USE OF HIGH THROUGHPUT TECHNOLOGIES AS DESCRIBED BY CMS-2020-01-R: HCPCS

## 2023-03-28 PROCEDURE — U0005 INFEC AGEN DETEC AMPLI PROBE: HCPCS

## 2023-03-29 LAB — SARS-COV-2 RNA RESP QL NAA+PROBE: NEGATIVE

## 2023-03-29 NOTE — RESULT ENCOUNTER NOTE
Narinder Whitman  YOB: 1985  Specimen Collected: 03/28/23  9:19 AM  CST    Your SARS CoV2 PCR (Covid-19) RNA test is NEGATIVE!  We recommend that you print off these entire results and keep with your travel documents.     Have a safe and healthy trip      Dominique Palumbo (Lori) CNP

## 2023-04-23 ENCOUNTER — HEALTH MAINTENANCE LETTER (OUTPATIENT)
Age: 38
End: 2023-04-23

## 2024-04-27 ENCOUNTER — HEALTH MAINTENANCE LETTER (OUTPATIENT)
Age: 39
End: 2024-04-27

## 2025-05-11 ENCOUNTER — HEALTH MAINTENANCE LETTER (OUTPATIENT)
Age: 40
End: 2025-05-11